# Patient Record
Sex: MALE
[De-identification: names, ages, dates, MRNs, and addresses within clinical notes are randomized per-mention and may not be internally consistent; named-entity substitution may affect disease eponyms.]

---

## 2021-05-24 PROBLEM — Z00.00 ENCOUNTER FOR PREVENTIVE HEALTH EXAMINATION: Status: ACTIVE | Noted: 2021-05-24

## 2021-05-27 ENCOUNTER — APPOINTMENT (OUTPATIENT)
Dept: RADIATION ONCOLOGY | Facility: CLINIC | Age: 46
End: 2021-05-27
Payer: COMMERCIAL

## 2021-05-27 DIAGNOSIS — I10 ESSENTIAL (PRIMARY) HYPERTENSION: ICD-10-CM

## 2021-05-27 DIAGNOSIS — C61 MALIGNANT NEOPLASM OF PROSTATE: ICD-10-CM

## 2021-05-27 DIAGNOSIS — Z92.3 PERSONAL HISTORY OF IRRADIATION: ICD-10-CM

## 2021-05-27 DIAGNOSIS — Z78.9 OTHER SPECIFIED HEALTH STATUS: ICD-10-CM

## 2021-05-27 PROCEDURE — 99214 OFFICE O/P EST MOD 30 MIN: CPT | Mod: 95

## 2021-05-27 RX ORDER — LOSARTAN POTASSIUM 100 MG/1
TABLET, FILM COATED ORAL
Refills: 0 | Status: ACTIVE | COMMUNITY

## 2021-05-27 RX ORDER — MULTIVITAMIN
TABLET ORAL
Refills: 0 | Status: ACTIVE | COMMUNITY

## 2021-05-27 RX ORDER — SILDENAFIL 100 MG/1
100 TABLET, FILM COATED ORAL
Qty: 30 | Refills: 3 | Status: ACTIVE | COMMUNITY
Start: 2021-05-27

## 2021-05-27 RX ORDER — SILDENAFIL CITRATE 100 MG/1
TABLET, FILM COATED ORAL
Refills: 0 | Status: ACTIVE | COMMUNITY

## 2021-05-27 NOTE — HISTORY OF PRESENT ILLNESS
[Home] : at home, [unfilled] , at the time of the visit. [Medical Office: ([facility])___] : at the medical office located in  [Verbal consent obtained from patient] : the patient, [unfilled] [FreeTextEntry1] : Mr. 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 He completed definitive stereotactic body radiation therapy to the prostate using [employer] technology for a total of 3500 cGy over 5 fractions from 4/11/16 to 4/15/16.  \par \par 5/27/21- FOLLOW UP\par Mr. Sandy followed up today via phone. Today, he notes he feels generally well. He reports nocturia x0-1.  He denies dysuria or incontinence.  He denies blood in the urine.  He has no blood or mucous in the stool, and denies rectal pain.  He notes good erections and uses Viagra on a PRN basis.  He follows closely with Dr. Muhammad and has had benign exams.\par \par PSA trend:\par 6/30/16- 3.8 ng/mL\par 10/6/16- 1.7 ng/mL\par 12/28/16- 2.6 ng/mL\par 3/22/17- 2.8 ng/mL\par 8/8/17- 0.4 ng/mL\par 12/6/17- 0.5 ng/mL\par 5/30/18- 1.2 ng/mL\par 12/18/18- 1.4 ng/mL\par 6/5/19- 1.4 ng/mL with a Testosterone of 618 ng/dL\par 11/26/19- 1.9 ng/mL\par 2/26/2020- 1.2 ng/mL\par 4/12/21- 0.6 ng/mL\par \par \par

## 2021-05-27 NOTE — DISEASE MANAGEMENT
[2] : T2 [a] : a [0-10] : 0 -10 ng/mL [6] : [place] Score 6 [Treatment with radiation therapy] : Treatment with radiation therapy [EBRT] : EBRT [BiopsyDate] : 12/14/15 [TotalCores] : 12 [TotalPositiveCores] : 3 [MaxCoreInvolvement] : 20 [I] : I [RadiationCompletedDate] : 4/15/16 [EBRTDose] : 3500 cGy [EBRTFractions] : 5

## 2023-10-01 PROBLEM — Z92.3 HISTORY OF RADIATION THERAPY: Status: RESOLVED | Noted: 2021-05-27 | Resolved: 2023-10-01
